# Patient Record
Sex: FEMALE | Race: WHITE | Employment: OTHER | URBAN - METROPOLITAN AREA
[De-identification: names, ages, dates, MRNs, and addresses within clinical notes are randomized per-mention and may not be internally consistent; named-entity substitution may affect disease eponyms.]

---

## 2017-04-04 PROCEDURE — 84439 ASSAY OF FREE THYROXINE: CPT | Performed by: SPECIALIST

## 2017-04-04 PROCEDURE — 85025 COMPLETE CBC W/AUTO DIFF WBC: CPT | Performed by: SPECIALIST

## 2017-04-04 PROCEDURE — 83090 ASSAY OF HOMOCYSTEINE: CPT | Performed by: SPECIALIST

## 2017-04-04 PROCEDURE — 87798 DETECT AGENT NOS DNA AMP: CPT | Performed by: SPECIALIST

## 2017-04-04 PROCEDURE — 82607 VITAMIN B-12: CPT | Performed by: SPECIALIST

## 2017-04-04 PROCEDURE — 36415 COLL VENOUS BLD VENIPUNCTURE: CPT | Performed by: SPECIALIST

## 2017-04-04 PROCEDURE — 86140 C-REACTIVE PROTEIN: CPT | Performed by: SPECIALIST

## 2017-04-04 PROCEDURE — 84481 FREE ASSAY (FT-3): CPT | Performed by: SPECIALIST

## 2017-11-27 PROBLEM — E78.00 HYPERCHOLESTEROLEMIA: Status: ACTIVE | Noted: 2017-11-27

## 2017-11-27 PROBLEM — M85.80 OSTEOPENIA: Status: ACTIVE | Noted: 2017-11-27

## 2017-11-27 PROBLEM — E55.9 VITAMIN D DEFICIENCY: Status: ACTIVE | Noted: 2017-11-27

## 2017-12-29 ENCOUNTER — APPOINTMENT (OUTPATIENT)
Dept: LAB | Age: 67
End: 2017-12-29
Attending: NURSE PRACTITIONER
Payer: MEDICARE

## 2017-12-29 PROCEDURE — 83525 ASSAY OF INSULIN: CPT | Performed by: NURSE PRACTITIONER

## 2017-12-29 PROCEDURE — 83090 ASSAY OF HOMOCYSTEINE: CPT | Performed by: NURSE PRACTITIONER

## 2017-12-29 PROCEDURE — 83695 ASSAY OF LIPOPROTEIN(A): CPT | Performed by: NURSE PRACTITIONER

## 2017-12-29 PROCEDURE — 82607 VITAMIN B-12: CPT | Performed by: NURSE PRACTITIONER

## 2017-12-29 PROCEDURE — 82652 VIT D 1 25-DIHYDROXY: CPT | Performed by: NURSE PRACTITIONER

## 2017-12-29 PROCEDURE — 86141 C-REACTIVE PROTEIN HS: CPT | Performed by: NURSE PRACTITIONER

## 2018-11-30 PROCEDURE — 83090 ASSAY OF HOMOCYSTEINE: CPT | Performed by: SPECIALIST

## 2018-11-30 PROCEDURE — 86141 C-REACTIVE PROTEIN HS: CPT | Performed by: SPECIALIST

## 2018-11-30 PROCEDURE — 86628 CANDIDA ANTIBODY: CPT | Performed by: SPECIALIST

## 2018-11-30 PROCEDURE — 86665 EPSTEIN-BARR CAPSID VCA: CPT | Performed by: SPECIALIST

## 2018-11-30 PROCEDURE — 86664 EPSTEIN-BARR NUCLEAR ANTIGEN: CPT | Performed by: SPECIALIST

## 2018-11-30 PROCEDURE — 81001 URINALYSIS AUTO W/SCOPE: CPT | Performed by: SPECIALIST

## 2018-11-30 PROCEDURE — 86376 MICROSOMAL ANTIBODY EACH: CPT | Performed by: SPECIALIST

## 2018-11-30 PROCEDURE — 86663 EPSTEIN-BARR ANTIBODY: CPT | Performed by: SPECIALIST

## 2018-11-30 PROCEDURE — 83525 ASSAY OF INSULIN: CPT | Performed by: SPECIALIST

## 2018-11-30 PROCEDURE — 83695 ASSAY OF LIPOPROTEIN(A): CPT | Performed by: SPECIALIST

## 2019-01-25 PROBLEM — M81.8 OTHER OSTEOPOROSIS WITHOUT CURRENT PATHOLOGICAL FRACTURE: Status: ACTIVE | Noted: 2019-01-25

## 2019-01-28 PROCEDURE — 81003 URINALYSIS AUTO W/O SCOPE: CPT | Performed by: NURSE PRACTITIONER

## 2019-04-18 PROCEDURE — 83525 ASSAY OF INSULIN: CPT | Performed by: NURSE PRACTITIONER

## 2019-04-18 PROCEDURE — 83695 ASSAY OF LIPOPROTEIN(A): CPT | Performed by: NURSE PRACTITIONER

## 2019-04-18 PROCEDURE — 81001 URINALYSIS AUTO W/SCOPE: CPT | Performed by: NURSE PRACTITIONER

## 2020-02-06 PROBLEM — H33.311 RETINAL TEAR OF RIGHT EYE: Status: ACTIVE | Noted: 2020-02-06

## 2022-10-16 ENCOUNTER — HOSPITAL ENCOUNTER (INPATIENT)
Facility: HOSPITAL | Age: 72
LOS: 4 days | Discharge: NURSING, REHAB OR LONG-TERM CARE | End: 2022-10-20
Attending: EMERGENCY MEDICINE | Admitting: HOSPITALIST
Payer: MEDICARE

## 2022-10-16 ENCOUNTER — APPOINTMENT (OUTPATIENT)
Dept: MRI IMAGING | Facility: HOSPITAL | Age: 72
End: 2022-10-16
Attending: EMERGENCY MEDICINE
Payer: MEDICARE

## 2022-10-16 DIAGNOSIS — R29.2 HOFFMAN SIGN PRESENT: ICD-10-CM

## 2022-10-16 DIAGNOSIS — H61.23 BILATERAL HEARING LOSS DUE TO CERUMEN IMPACTION: ICD-10-CM

## 2022-10-16 DIAGNOSIS — I63.9 ACUTE CVA (CEREBROVASCULAR ACCIDENT) (HCC): Primary | ICD-10-CM

## 2022-10-16 DIAGNOSIS — Z86.16 HISTORY OF COVID-19: ICD-10-CM

## 2022-10-16 DIAGNOSIS — R53.1 WEAKNESS GENERALIZED: ICD-10-CM

## 2022-10-16 LAB
ANION GAP SERPL CALC-SCNC: 4 MMOL/L (ref 0–18)
APTT PPP: 28.4 SECONDS (ref 23.3–35.6)
BASOPHILS # BLD AUTO: 0.05 X10(3) UL (ref 0–0.2)
BASOPHILS NFR BLD AUTO: 0.6 %
BUN BLD-MCNC: 13 MG/DL (ref 7–18)
BUN/CREAT SERPL: 21.7 (ref 10–20)
CALCIUM BLD-MCNC: 8.4 MG/DL (ref 8.5–10.1)
CHLORIDE SERPL-SCNC: 110 MMOL/L (ref 98–112)
CHOLEST SERPL-MCNC: 156 MG/DL (ref ?–200)
CO2 SERPL-SCNC: 27 MMOL/L (ref 21–32)
CREAT BLD-MCNC: 0.6 MG/DL
DEPRECATED RDW RBC AUTO: 41.9 FL (ref 35.1–46.3)
EOSINOPHIL # BLD AUTO: 0.04 X10(3) UL (ref 0–0.7)
EOSINOPHIL NFR BLD AUTO: 0.5 %
ERYTHROCYTE [DISTWIDTH] IN BLOOD BY AUTOMATED COUNT: 12.7 % (ref 11–15)
GFR SERPLBLD BASED ON 1.73 SQ M-ARVRAT: 95 ML/MIN/1.73M2 (ref 60–?)
GLUCOSE BLD-MCNC: 115 MG/DL (ref 70–99)
GLUCOSE BLDC GLUCOMTR-MCNC: 105 MG/DL (ref 70–99)
HCT VFR BLD AUTO: 41.8 %
HDLC SERPL-MCNC: 55 MG/DL (ref 40–59)
HGB BLD-MCNC: 13.9 G/DL
IMM GRANULOCYTES # BLD AUTO: 0.03 X10(3) UL (ref 0–1)
IMM GRANULOCYTES NFR BLD: 0.4 %
INR BLD: 1.1 (ref 0.85–1.16)
LDLC SERPL CALC-MCNC: 91 MG/DL (ref ?–100)
LYMPHOCYTES # BLD AUTO: 1.88 X10(3) UL (ref 1–4)
LYMPHOCYTES NFR BLD AUTO: 23.4 %
MCH RBC QN AUTO: 30 PG (ref 26–34)
MCHC RBC AUTO-ENTMCNC: 33.3 G/DL (ref 31–37)
MCV RBC AUTO: 90.1 FL
MONOCYTES # BLD AUTO: 0.89 X10(3) UL (ref 0.1–1)
MONOCYTES NFR BLD AUTO: 11.1 %
NEUTROPHILS # BLD AUTO: 5.15 X10 (3) UL (ref 1.5–7.7)
NEUTROPHILS # BLD AUTO: 5.15 X10(3) UL (ref 1.5–7.7)
NEUTROPHILS NFR BLD AUTO: 64 %
NONHDLC SERPL-MCNC: 101 MG/DL (ref ?–130)
OSMOLALITY SERPL CALC.SUM OF ELEC: 293 MOSM/KG (ref 275–295)
PLATELET # BLD AUTO: 315 10(3)UL (ref 150–450)
POTASSIUM SERPL-SCNC: 4.2 MMOL/L (ref 3.5–5.1)
PROTHROMBIN TIME: 14.1 SECONDS (ref 11.6–14.8)
RBC # BLD AUTO: 4.64 X10(6)UL
SARS-COV-2 RNA RESP QL NAA+PROBE: DETECTED
SODIUM SERPL-SCNC: 141 MMOL/L (ref 136–145)
TRIGL SERPL-MCNC: 48 MG/DL (ref 30–149)
VLDLC SERPL CALC-MCNC: 8 MG/DL (ref 0–30)
WBC # BLD AUTO: 8 X10(3) UL (ref 4–11)

## 2022-10-16 PROCEDURE — 70551 MRI BRAIN STEM W/O DYE: CPT | Performed by: EMERGENCY MEDICINE

## 2022-10-16 RX ORDER — ASPIRIN 81 MG/1
81 TABLET, CHEWABLE ORAL DAILY
Status: DISCONTINUED | OUTPATIENT
Start: 2022-10-17 | End: 2022-10-20

## 2022-10-16 RX ORDER — LORAZEPAM 1 MG/1
0.5 TABLET ORAL ONCE
Status: COMPLETED | OUTPATIENT
Start: 2022-10-16 | End: 2022-10-16

## 2022-10-16 RX ORDER — ASPIRIN 300 MG/1
300 SUPPOSITORY RECTAL ONCE
Status: COMPLETED | OUTPATIENT
Start: 2022-10-16 | End: 2022-10-16

## 2022-10-16 RX ORDER — METOCLOPRAMIDE HYDROCHLORIDE 5 MG/ML
10 INJECTION INTRAMUSCULAR; INTRAVENOUS EVERY 8 HOURS PRN
Status: DISCONTINUED | OUTPATIENT
Start: 2022-10-16 | End: 2022-10-20

## 2022-10-16 RX ORDER — ASPIRIN 81 MG/1
324 TABLET, CHEWABLE ORAL ONCE
Status: DISCONTINUED | OUTPATIENT
Start: 2022-10-16 | End: 2022-10-16

## 2022-10-16 RX ORDER — HYDRALAZINE HYDROCHLORIDE 20 MG/ML
10 INJECTION INTRAMUSCULAR; INTRAVENOUS EVERY 2 HOUR PRN
Status: DISCONTINUED | OUTPATIENT
Start: 2022-10-16 | End: 2022-10-20

## 2022-10-16 RX ORDER — ENOXAPARIN SODIUM 100 MG/ML
40 INJECTION SUBCUTANEOUS DAILY
Status: DISCONTINUED | OUTPATIENT
Start: 2022-10-17 | End: 2022-10-20

## 2022-10-16 RX ORDER — LABETALOL HYDROCHLORIDE 5 MG/ML
10 INJECTION, SOLUTION INTRAVENOUS EVERY 10 MIN PRN
Status: DISCONTINUED | OUTPATIENT
Start: 2022-10-16 | End: 2022-10-20

## 2022-10-16 RX ORDER — ONDANSETRON 2 MG/ML
4 INJECTION INTRAMUSCULAR; INTRAVENOUS EVERY 6 HOURS PRN
Status: DISCONTINUED | OUTPATIENT
Start: 2022-10-16 | End: 2022-10-20

## 2022-10-16 RX ORDER — QUINIDINE SULFATE 200 MG
3 TABLET ORAL DAILY
COMMUNITY

## 2022-10-16 RX ORDER — ACETAMINOPHEN 650 MG/1
650 SUPPOSITORY RECTAL EVERY 4 HOURS PRN
Status: DISCONTINUED | OUTPATIENT
Start: 2022-10-16 | End: 2022-10-20

## 2022-10-16 RX ORDER — ACETAMINOPHEN 325 MG/1
650 TABLET ORAL EVERY 4 HOURS PRN
Status: DISCONTINUED | OUTPATIENT
Start: 2022-10-16 | End: 2022-10-20

## 2022-10-16 RX ORDER — ATORVASTATIN CALCIUM 40 MG/1
40 TABLET, FILM COATED ORAL NIGHTLY
Status: DISCONTINUED | OUTPATIENT
Start: 2022-10-16 | End: 2022-10-20

## 2022-10-16 NOTE — ED INITIAL ASSESSMENT (HPI)
Patient to Er from home with c/o + covid test 12 days ago. Patient states her pulse ox went to 90% while at home. Patient denies shortness of breath. Speaking in full sentences. C/o ear pressure and fatigue.

## 2022-10-16 NOTE — ED QUICK NOTES
Patient notes that she fell 2 days ago due to \"feel off balanced\". Denies injury or complaint due to fall.

## 2022-10-17 ENCOUNTER — APPOINTMENT (OUTPATIENT)
Dept: CV DIAGNOSTICS | Facility: HOSPITAL | Age: 72
End: 2022-10-17
Attending: HOSPITALIST
Payer: MEDICARE

## 2022-10-17 ENCOUNTER — APPOINTMENT (OUTPATIENT)
Dept: CT IMAGING | Facility: HOSPITAL | Age: 72
End: 2022-10-17
Attending: Other
Payer: MEDICARE

## 2022-10-17 LAB
ANION GAP SERPL CALC-SCNC: 3 MMOL/L (ref 0–18)
ANION GAP SERPL CALC-SCNC: 6 MMOL/L (ref 0–18)
BASOPHILS # BLD AUTO: 0.04 X10(3) UL (ref 0–0.2)
BASOPHILS NFR BLD AUTO: 0.7 %
BUN BLD-MCNC: 12 MG/DL (ref 7–18)
BUN BLD-MCNC: 13 MG/DL (ref 7–18)
BUN/CREAT SERPL: 25.5 (ref 10–20)
BUN/CREAT SERPL: 26 (ref 10–20)
CALCIUM BLD-MCNC: 7.9 MG/DL (ref 8.5–10.1)
CALCIUM BLD-MCNC: 8.2 MG/DL (ref 8.5–10.1)
CHLORIDE SERPL-SCNC: 111 MMOL/L (ref 98–112)
CHLORIDE SERPL-SCNC: 111 MMOL/L (ref 98–112)
CO2 SERPL-SCNC: 26 MMOL/L (ref 21–32)
CO2 SERPL-SCNC: 28 MMOL/L (ref 21–32)
CREAT BLD-MCNC: 0.47 MG/DL
CREAT BLD-MCNC: 0.5 MG/DL
DEPRECATED RDW RBC AUTO: 43.7 FL (ref 35.1–46.3)
EOSINOPHIL # BLD AUTO: 0.16 X10(3) UL (ref 0–0.7)
EOSINOPHIL NFR BLD AUTO: 2.7 %
ERYTHROCYTE [DISTWIDTH] IN BLOOD BY AUTOMATED COUNT: 13 % (ref 11–15)
EST. AVERAGE GLUCOSE BLD GHB EST-MCNC: 126 MG/DL (ref 68–126)
GFR SERPLBLD BASED ON 1.73 SQ M-ARVRAT: 100 ML/MIN/1.73M2 (ref 60–?)
GFR SERPLBLD BASED ON 1.73 SQ M-ARVRAT: 99 ML/MIN/1.73M2 (ref 60–?)
GLUCOSE BLD-MCNC: 100 MG/DL (ref 70–99)
GLUCOSE BLD-MCNC: 102 MG/DL (ref 70–99)
GLUCOSE BLDC GLUCOMTR-MCNC: 103 MG/DL (ref 70–99)
HBA1C MFR BLD: 6 % (ref ?–5.7)
HCT VFR BLD AUTO: 40.9 %
HGB BLD-MCNC: 13.2 G/DL
IMM GRANULOCYTES # BLD AUTO: 0.02 X10(3) UL (ref 0–1)
IMM GRANULOCYTES NFR BLD: 0.3 %
LYMPHOCYTES # BLD AUTO: 1.22 X10(3) UL (ref 1–4)
LYMPHOCYTES NFR BLD AUTO: 20.5 %
MAGNESIUM SERPL-MCNC: 2.6 MG/DL (ref 1.6–2.6)
MCH RBC QN AUTO: 29.7 PG (ref 26–34)
MCHC RBC AUTO-ENTMCNC: 32.3 G/DL (ref 31–37)
MCV RBC AUTO: 91.9 FL
MONOCYTES # BLD AUTO: 0.76 X10(3) UL (ref 0.1–1)
MONOCYTES NFR BLD AUTO: 12.8 %
NEUTROPHILS # BLD AUTO: 3.76 X10 (3) UL (ref 1.5–7.7)
NEUTROPHILS # BLD AUTO: 3.76 X10(3) UL (ref 1.5–7.7)
NEUTROPHILS NFR BLD AUTO: 63 %
OSMOLALITY SERPL CALC.SUM OF ELEC: 294 MOSM/KG (ref 275–295)
OSMOLALITY SERPL CALC.SUM OF ELEC: 296 MOSM/KG (ref 275–295)
PLATELET # BLD AUTO: 292 10(3)UL (ref 150–450)
POTASSIUM SERPL-SCNC: 3.8 MMOL/L (ref 3.5–5.1)
POTASSIUM SERPL-SCNC: 3.9 MMOL/L (ref 3.5–5.1)
RBC # BLD AUTO: 4.45 X10(6)UL
SODIUM SERPL-SCNC: 142 MMOL/L (ref 136–145)
SODIUM SERPL-SCNC: 143 MMOL/L (ref 136–145)
WBC # BLD AUTO: 6 X10(3) UL (ref 4–11)

## 2022-10-17 PROCEDURE — 93306 TTE W/DOPPLER COMPLETE: CPT | Performed by: HOSPITALIST

## 2022-10-17 PROCEDURE — 70498 CT ANGIOGRAPHY NECK: CPT | Performed by: OTHER

## 2022-10-17 PROCEDURE — 70496 CT ANGIOGRAPHY HEAD: CPT | Performed by: OTHER

## 2022-10-17 RX ORDER — HYDROXYZINE HYDROCHLORIDE 10 MG/1
10 TABLET, FILM COATED ORAL 3 TIMES DAILY PRN
Status: DISCONTINUED | OUTPATIENT
Start: 2022-10-17 | End: 2022-10-20

## 2022-10-17 RX ORDER — MAGNESIUM OXIDE 400 MG (241.3 MG MAGNESIUM) TABLET
1 TABLET NIGHTLY
Status: DISCONTINUED | OUTPATIENT
Start: 2022-10-17 | End: 2022-10-20

## 2022-10-17 RX ORDER — SODIUM CHLORIDE 9 MG/ML
INJECTION, SOLUTION INTRAVENOUS CONTINUOUS
Status: DISCONTINUED | OUTPATIENT
Start: 2022-10-17 | End: 2022-10-18

## 2022-10-17 NOTE — PLAN OF CARE
Bed locked in low position, belongings in reach, bed alarm on as patient fell 3 days ago at home, call light in reach. Frequent rounding done, all patient needs met. Non-slip socks on/at bedside. Plan for an echo and [address] today. Every 2 hour neuros completed at 4am, with some slight improvement to the strength on left side (foot more than hand). Every 4 hour neuros start at 0800 today. NIH daily. Due to LT lip droop and slurring some words patient failed the bedside swallow eval, awaiting ST to evaluate patient. Son Tony Aguilar to bring POA papers for dayshift to copy this morning to have on file. Was able to SBA patient to the BR this morning, she did well, felt no dizziness at that time. Problem: Patient Centered Care  Goal: Patient preferences are identified and integrated in the patient's plan of care  Description: Interventions:  - What would you like us to know as we care for you? I live alone with my dogs. - Provide timely, complete, and accurate information to patient/family  - Incorporate patient and family knowledge, values, beliefs, and cultural backgrounds into the planning and delivery of care  - Encourage patient/family to participate in care and decision-making at the level they choose  - Honor patient and family perspectives and choices  Outcome: Progressing     Problem: CARDIOVASCULAR - ADULT  Goal: Maintains optimal cardiac output and hemodynamic stability  Description: INTERVENTIONS:  - Monitor vital signs, rhythm, and trends  - Monitor for bleeding, hypotension and signs of decreased cardiac output  - Evaluate effectiveness of vasoactive medications to optimize hemodynamic stability  - Monitor arterial and/or venous puncture sites for bleeding and/or hematoma  - Assess quality of pulses, skin color and temperature  - Assess for signs of decreased coronary artery perfusion - ex.  Angina  - Evaluate fluid balance, assess for edema, trend weights  Outcome: Progressing  Goal: Absence of cardiac arrhythmias or at baseline  Description: INTERVENTIONS:  - Continuous cardiac monitoring, monitor vital signs, obtain 12 lead EKG if indicated  - Evaluate effectiveness of antiarrhythmic and heart rate control medications as ordered  - Initiate emergency measures for life threatening arrhythmias  - Monitor electrolytes and administer replacement therapy as ordered  Outcome: Progressing     Problem: METABOLIC/FLUID AND ELECTROLYTES - ADULT  Goal: Electrolytes maintained within normal limits  Description: INTERVENTIONS:  - Monitor labs and rhythm and assess patient for signs and symptoms of electrolyte imbalances  - Administer electrolyte replacement as ordered  - Monitor response to electrolyte replacements, including rhythm and repeat lab results as appropriate  - Fluid restriction as ordered  - Instruct patient on fluid and nutrition restrictions as appropriate  Outcome: Progressing     Problem: NEUROLOGICAL - ADULT  Goal: Achieves stable or improved neurological status  Description: INTERVENTIONS  - Assess for and report changes in neurological status  - Initiate measures to prevent increased intracranial pressure  - Maintain blood pressure and fluid volume within ordered parameters to optimize cerebral perfusion and minimize risk of hemorrhage  - Monitor temperature, glucose, and sodium.  Initiate appropriate interventions as ordered  Outcome: Progressing  Goal: Achieves maximal functionality and self care  Description: INTERVENTIONS  - Monitor swallowing and airway patency with patient fatigue and changes in neurological status  - Encourage and assist patient to increase activity and self care with guidance from PT/OT  - Encourage visually impaired, hearing impaired and aphasic patients to use assistive/communication devices  Outcome: Progressing

## 2022-10-17 NOTE — ED PROVIDER NOTES
Signed out by previous shift to follow-up results of diagnostic work-up. Patient had initially presented with some generalized weakness and lightheadedness. She subsequently mentioned after initial work-up was completed that she thought she is having some weakness in her left arm and her friend had stated that she had had some dysarthria on the phone earlier today. Patient has no dysarthria at this time but does have some mild left facial droop and slight decrease in  strength in her left hand despite having no difficulty in movement or any drift. 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 Patient has been covered with aspirin. Discussed with [facility] hospitalist as well as neurology. Patient and family are comfortable with plan for admission. Patient is not a candidate for tPA given onset of symptoms was last night. Critical care time exclusive of procedure time spent on this patient was 35 min for taking history from patient examining patient, medical decision-making, reviewing lab work and radiology studies, explaining results to patient and family, discussing with consultants/admitting physician, and documenting in patient's chart.

## 2022-10-17 NOTE — ED QUICK NOTES
Patient failed dysphasia screening - aspirin given rectally. Patient and family made aware of dispo. Awaiting for admit bed. NIH scale completed, baseline of 3. Vital signs stable.  Patient resting in cart at this time

## 2022-10-17 NOTE — CM/SW NOTE
10/17/22 1100   CM/SW Referral Data   Referral Source Physician   Reason for Referral Protocol order set   Specify order set Stroke   Informant Patient   Pertinent Medical Hx   Does patient have an established PCP? Yes  (Reanna Spicer)   Patient [address]   Number of Levels in Home 1   Number of Stair in Home 1   Patient lives with Alone   Patient Status Prior to Admission   Independent with ADLs and Mobility Yes   Discharge Needs   Anticipated D/C needs Home health care   Choice of Post-Acute Provider   Informed patient of right to choose their preferred provider Yes   List of appropriate post-acute services provided to patient/family with quality data   ([facility] - needs f/up)     SW received MDO for [facility]. SW met w/ pt and her son in her room wearing appropriate PPE. Above assessment completed. Per pt, she lives alone and does not use any assistive devices for ambulation. Pt confirms she was driving until she was first positive for COVID and became weak. Pt denies hx w/ HH or [facility]. Pt has hx w/ outpatient PT at [facility] after a knee surgery. Per RN rounds, pt is ambulating w/ 1 assist and a walker at this time. SW discussed possible recommendation for HH at time of DC. Pt is agreeable to this. SW requested [facility] send [facility] ref[facility] via [facility]. F2F entered. Will need to f/up and provide [facility] list for choice. PLAN: Home w/ HH - pending PT/OT evals/recs, pending accept/choice, pending med clear      SW/CM to remain available for support and/or discharge planning.          Lisa Herrera, MSW, [facility]

## 2022-10-17 NOTE — ED QUICK NOTES
Orders for admission, patient is aware of plan and ready to go upstairs. Any questions, please call ED GENA Valenzuela at extension [phone].      Patient Covid vaccination status: Unvaccinated     COVID Test Ordered in ED: Rapid SARS-CoV-2 by PCR    COVID Suspicion at Admission: N/A    Running Infusions:  None    Mental Status/LOC at time of transport: A&Ox4    Other pertinent information:   CIWA score: N/A   NIH score:  N/A

## 2022-10-17 NOTE — CM/SW NOTE
Department  notified of request for [facility] referrals started. Assigned CM/SW to follow up with pt/family on further discharge planning.      Cl Garg   October 17, 2022   11:31

## 2022-10-18 ENCOUNTER — APPOINTMENT (OUTPATIENT)
Dept: CT IMAGING | Facility: HOSPITAL | Age: 72
End: 2022-10-18
Attending: HOSPITALIST
Payer: MEDICARE

## 2022-10-18 LAB
ANION GAP SERPL CALC-SCNC: 7 MMOL/L (ref 0–18)
BUN BLD-MCNC: 18 MG/DL (ref 7–18)
BUN/CREAT SERPL: 29 (ref 10–20)
CALCIUM BLD-MCNC: 7.8 MG/DL (ref 8.5–10.1)
CHLORIDE SERPL-SCNC: 111 MMOL/L (ref 98–112)
CO2 SERPL-SCNC: 26 MMOL/L (ref 21–32)
CREAT BLD-MCNC: 0.62 MG/DL
GFR SERPLBLD BASED ON 1.73 SQ M-ARVRAT: 94 ML/MIN/1.73M2 (ref 60–?)
GLUCOSE BLD-MCNC: 162 MG/DL (ref 70–99)
GLUCOSE BLDC GLUCOMTR-MCNC: 102 MG/DL (ref 70–99)
GLUCOSE BLDC GLUCOMTR-MCNC: 108 MG/DL (ref 70–99)
GLUCOSE BLDC GLUCOMTR-MCNC: 96 MG/DL (ref 70–99)
GLUCOSE BLDC GLUCOMTR-MCNC: 97 MG/DL (ref 70–99)
OSMOLALITY SERPL CALC.SUM OF ELEC: 303 MOSM/KG (ref 275–295)
POTASSIUM SERPL-SCNC: 3.5 MMOL/L (ref 3.5–5.1)
SODIUM SERPL-SCNC: 144 MMOL/L (ref 136–145)

## 2022-10-18 PROCEDURE — 99223 1ST HOSP IP/OBS HIGH 75: CPT | Performed by: OTHER

## 2022-10-18 PROCEDURE — 71260 CT THORAX DX C+: CPT | Performed by: HOSPITALIST

## 2022-10-18 RX ORDER — ATORVASTATIN CALCIUM 40 MG/1
40 TABLET, FILM COATED ORAL NIGHTLY
Qty: 90 TABLET | Refills: 1 | Status: SHIPPED | OUTPATIENT
Start: 2022-10-18 | End: 2023-04-16

## 2022-10-18 RX ORDER — ASPIRIN 81 MG/1
81 TABLET, CHEWABLE ORAL DAILY
Qty: 90 TABLET | Refills: 3 | Status: SHIPPED | OUTPATIENT
Start: 2022-10-19 | End: 2023-10-19

## 2022-10-18 NOTE — CM/SW NOTE
Department  notified of request for Acute Rehab, [facility] referrals started. Assigned CM/SW to follow up with pt/family on further discharge planning.      Slava Moore   October 18, 2022   14:01

## 2022-10-18 NOTE — PLAN OF CARE
Bed locked in low position, belongings in reach, call light in reach. Frequent rounding done, all patient needs met. Non-slip socks on/at bedside. Son at bedside tonight. Neuros Q4 hrs continue, patient is almost equally strong bilaterally, slight droop and slur still noted. Awaiting PT/OT eval hopefully later today. Atarax given for anxiety, see MAR. IVF continues. Pills whole in apple sauce, NTL no straw. Problem: CARDIOVASCULAR - ADULT  Goal: Maintains optimal cardiac output and hemodynamic stability  Description: INTERVENTIONS:  - Monitor vital signs, rhythm, and trends  - Monitor for bleeding, hypotension and signs of decreased cardiac output  - Evaluate effectiveness of vasoactive medications to optimize hemodynamic stability  - Monitor arterial and/or venous puncture sites for bleeding and/or hematoma  - Assess quality of pulses, skin color and temperature  - Assess for signs of decreased coronary artery perfusion - ex.  Angina  - Evaluate fluid balance, assess for edema, trend weights  Outcome: Progressing  Goal: Absence of cardiac arrhythmias or at baseline  Description: INTERVENTIONS:  - Continuous cardiac monitoring, monitor vital signs, obtain 12 lead EKG if indicated  - Evaluate effectiveness of antiarrhythmic and heart rate control medications as ordered  - Initiate emergency measures for life threatening arrhythmias  - Monitor electrolytes and administer replacement therapy as ordered  Outcome: Progressing     Problem: METABOLIC/FLUID AND ELECTROLYTES - ADULT  Goal: Electrolytes maintained within normal limits  Description: INTERVENTIONS:  - Monitor labs and rhythm and assess patient for signs and symptoms of electrolyte imbalances  - Administer electrolyte replacement as ordered  - Monitor response to electrolyte replacements, including rhythm and repeat lab results as appropriate  - Fluid restriction as ordered  - Instruct patient on fluid and nutrition restrictions as appropriate  Outcome: Progressing     Problem: NEUROLOGICAL - ADULT  Goal: Achieves stable or improved neurological status  Description: INTERVENTIONS  - Assess for and report changes in neurological status  - Initiate measures to prevent increased intracranial pressure  - Maintain blood pressure and fluid volume within ordered parameters to optimize cerebral perfusion and minimize risk of hemorrhage  - Monitor temperature, glucose, and sodium.  Initiate appropriate interventions as ordered  Outcome: Progressing  Goal: Achieves maximal functionality and self care  Description: INTERVENTIONS  - Monitor swallowing and airway patency with patient fatigue and changes in neurological status  - Encourage and assist patient to increase activity and self care with guidance from PT/OT  - Encourage visually impaired, hearing impaired and aphasic patients to use assistive/communication devices  Outcome: Progressing

## 2022-10-18 NOTE — CM/SW NOTE
PT/OT/ST are recommending acute rehab for the pt. SW met with the pt. And her family at bedside and explained acute rehab. The pt. Is agreeable to referrals being sent. PMR consult is entered and pending. Will need to follow up with list and choice. The pt. Is aware and agreeable to the above.      Grace Sauer, [facility] ext [phone]

## 2022-10-18 NOTE — HOME CARE LIAISON
Patient and sons met with and provided with list of [facility] providers from [facility], patient choice is [address]. Agency reserved in [facility] and contact information placed on AVS.   Financial interest disclosure provided to patient. JESUS Carvalho Slight updated.

## 2022-10-18 NOTE — PROGRESS NOTES
SPEECH/LANGUAGE/COGNITIVE EVALUATION - INPATIENT    Admission Date: 10/16/2022  Evaluation Date: 10/18/22    Reason for Referral: Stroke protocol    ASSESSMENT & PLAN   ASSESSMENT & IMPRESSION    Patient seen for cognitive-communication evaluation per stroke protocol, MRI brain remarkable for R central semiovale infarct as below. Patient admitted from home; PLOF independent in home, driving, retired, active in home/community. Patient denies hx speech/language/cognitive impairment. Oral motor mechanism examination remarkable for mild L facial droop with speech intelligibility preserved to 100% in known context with use of slow speech rate. Language supports conversation without overt deficits. Patient achieved SLUMS score of 26/30 which indicates cognitive-communication [facility] however anticipate patient would benefit from more complex/thorough assessment of cognitive-communication in next level of care in context of high PLOF and motivation to resolve symptoms. Plan and recommendations reviewed with patient, patient's sons and RN at bedside. [place] Mental Status (SLUMS) Examination. The results were as follows:    Orientation and Attention (out of 3): 3  Memory (out of 5-no points given in this item): 5  Calculation and Registration (out of 3): 3  Generative Naming with time constraint (out of 3): 2 (12 items/minute)  Delayed Recall with interference (out of 5): 4  Registration and Digit Span/Reverse Number Recall (out of 2): 1  Clock Drawing (out of 4): 4  Visuospatial Skills/Figure Identification (out of 2):  2  Auditory Comprehension/Story Recall with Executive Function (out of 8): 8  Composite: 26/30  (Normal: 25-30/30, Mild Neurocognitive Disorder: 20-24/30, Dementia: 1-19/30)    Assessment(s) Administered: SLUMS 26/30    Discharge Recommendations/Plan: Acute rehabilitation - SLP consult     Patient Experiencing Pain: No    Prior Living Situation: Home alone  Prior Level of Function: Independent Imaging Results:   MRI brain 10/16/22:  CONCLUSION: Abnormal diffusion restriction throughout the right basal ganglia compatible with acute/recent infarct. Patient/Family Goals: Return home independently    Interdisciplinary Communication: Discussed with RN    Patient, family and/or caregiver has been informed and has taken part in this evaluation and plan of treatment and have been advised and agree on the findings and goals.       FOLLOW UP  Acute cognitive-communciation f/u does not appear indicated at this time, recommend f/u in next level of care    Thank you for your referral.  If you have any questions please contact Kamala De Oliveira, [facility] Pathologist  [phone]

## 2022-10-18 NOTE — CM/SW NOTE
[facility] been reserved in [address]. [facility] will continue to follow along with the pt. PT/OT evals are still pending. Plan: TBD pending PT/OT evals.      Ronald NegronAt[facility] Health [facility] Baldwin ext [phone]

## 2022-10-19 LAB
GLUCOSE BLDC GLUCOMTR-MCNC: 105 MG/DL (ref 70–99)
GLUCOSE BLDC GLUCOMTR-MCNC: 112 MG/DL (ref 70–99)
GLUCOSE BLDC GLUCOMTR-MCNC: 85 MG/DL (ref 70–99)

## 2022-10-19 NOTE — PLAN OF CARE
Pt received in no acute distress, NIHSS daily, neuro q shift, up w/SB, plan is acute rehab, awaiting PMR eval. Plan is CT chest. Fall/safety precautions in place, call light within pt reach, hourly rounding maintained. Problem: Patient Centered Care  Goal: Patient preferences are identified and integrated in the patient's plan of care  Description: Interventions:  - What would you like us to know as we care for you? From home alone  - Provide timely, complete, and accurate information to patient/family  - Incorporate patient and family knowledge, values, beliefs, and cultural backgrounds into the planning and delivery of care  - Encourage patient/family to participate in care and decision-making at the level they choose  - Honor patient and family perspectives and choices  Outcome: Progressing     Problem: CARDIOVASCULAR - ADULT  Goal: Maintains optimal cardiac output and hemodynamic stability  Description: INTERVENTIONS:  - Monitor vital signs, rhythm, and trends  - Monitor for bleeding, hypotension and signs of decreased cardiac output  - Evaluate effectiveness of vasoactive medications to optimize hemodynamic stability  - Monitor arterial and/or venous puncture sites for bleeding and/or hematoma  - Assess quality of pulses, skin color and temperature  - Assess for signs of decreased coronary artery perfusion - ex.  Angina  - Evaluate fluid balance, assess for edema, trend weights  Outcome: Progressing  Goal: Absence of cardiac arrhythmias or at baseline  Description: INTERVENTIONS:  - Continuous cardiac monitoring, monitor vital signs, obtain 12 lead EKG if indicated  - Evaluate effectiveness of antiarrhythmic and heart rate control medications as ordered  - Initiate emergency measures for life threatening arrhythmias  - Monitor electrolytes and administer replacement therapy as ordered  Outcome: Progressing     Problem: METABOLIC/FLUID AND ELECTROLYTES - ADULT  Goal: Electrolytes maintained within normal limits  Description: INTERVENTIONS:  - Monitor labs and rhythm and assess patient for signs and symptoms of electrolyte imbalances  - Administer electrolyte replacement as ordered  - Monitor response to electrolyte replacements, including rhythm and repeat lab results as appropriate  - Fluid restriction as ordered  - Instruct patient on fluid and nutrition restrictions as appropriate  Outcome: Progressing     Problem: NEUROLOGICAL - ADULT  Goal: Achieves stable or improved neurological status  Description: INTERVENTIONS  - Assess for and report changes in neurological status  - Initiate measures to prevent increased intracranial pressure  - Maintain blood pressure and fluid volume within ordered parameters to optimize cerebral perfusion and minimize risk of hemorrhage  - Monitor temperature, glucose, and sodium.  Initiate appropriate interventions as ordered  Outcome: Progressing  Goal: Achieves maximal functionality and self care  Description: INTERVENTIONS  - Monitor swallowing and airway patency with patient fatigue and changes in neurological status  - Encourage and assist patient to increase activity and self care with guidance from PT/OT  - Encourage visually impaired, hearing impaired and aphasic patients to use assistive/communication devices  Outcome: Progressing     Problem: Patient/Family Goals  Goal: Patient/Family Long Term Goal  Description: Patient's Long Term Goal: to be discharged home safely  Interventions:  - follow MD orders  -incentive spirometer   -encourage ambulation  -PT/OT  - See additional Care Plan goals for specific interventions  Outcome: Progressing  Goal: Patient/Family Short Term Goal  Description: Patient's Short Term Goal: To regain strength  Interventions:   - encourage ambulation  -PT/OT/Speech   - Range of motion  - See additional Care Plan goals for specific interventions  Outcome: Progressing

## 2022-10-19 NOTE — PLAN OF CARE
No acute changes overnight for Patricia Fluke. She denied any pain, headache, vision changes, or SOB. Ambulating well in room. She went down for a follow-up CT Chest to further investigate nodule in lung, results pending. She is anticipated to discharge to acute rehab pending medical clearance. Problem: Patient Centered Care  Goal: Patient preferences are identified and integrated in the patient's plan of care  Description: Interventions:  - What would you like us to know as we care for you? From home alone  - Provide timely, complete, and accurate information to patient/family  - Incorporate patient and family knowledge, values, beliefs, and cultural backgrounds into the planning and delivery of care  - Encourage patient/family to participate in care and decision-making at the level they choose  - Honor patient and family perspectives and choices  Outcome: Progressing     Problem: CARDIOVASCULAR - ADULT  Goal: Maintains optimal cardiac output and hemodynamic stability  Description: INTERVENTIONS:  - Monitor vital signs, rhythm, and trends  - Monitor for bleeding, hypotension and signs of decreased cardiac output  - Evaluate effectiveness of vasoactive medications to optimize hemodynamic stability  - Monitor arterial and/or venous puncture sites for bleeding and/or hematoma  - Assess quality of pulses, skin color and temperature  - Assess for signs of decreased coronary artery perfusion - ex.  Angina  - Evaluate fluid balance, assess for edema, trend weights  Outcome: Progressing  Goal: Absence of cardiac arrhythmias or at baseline  Description: INTERVENTIONS:  - Continuous cardiac monitoring, monitor vital signs, obtain 12 lead EKG if indicated  - Evaluate effectiveness of antiarrhythmic and heart rate control medications as ordered  - Initiate emergency measures for life threatening arrhythmias  - Monitor electrolytes and administer replacement therapy as ordered  Outcome: Progressing     Problem: METABOLIC/FLUID AND ELECTROLYTES - ADULT  Goal: Electrolytes maintained within normal limits  Description: INTERVENTIONS:  - Monitor labs and rhythm and assess patient for signs and symptoms of electrolyte imbalances  - Administer electrolyte replacement as ordered  - Monitor response to electrolyte replacements, including rhythm and repeat lab results as appropriate  - Fluid restriction as ordered  - Instruct patient on fluid and nutrition restrictions as appropriate  Outcome: Progressing     Problem: NEUROLOGICAL - ADULT  Goal: Achieves stable or improved neurological status  Description: INTERVENTIONS  - Assess for and report changes in neurological status  - Initiate measures to prevent increased intracranial pressure  - Maintain blood pressure and fluid volume within ordered parameters to optimize cerebral perfusion and minimize risk of hemorrhage  - Monitor temperature, glucose, and sodium.  Initiate appropriate interventions as ordered  Outcome: Progressing  Goal: Achieves maximal functionality and self care  Description: INTERVENTIONS  - Monitor swallowing and airway patency with patient fatigue and changes in neurological status  - Encourage and assist patient to increase activity and self care with guidance from PT/OT  - Encourage visually impaired, hearing impaired and aphasic patients to use assistive/communication devices  Outcome: Progressing     Problem: Patient/Family Goals  Goal: Patient/Family Long Term Goal  Description: Patient's Long Term Goal: Maximize my recovery from the stroke to minimize deficits    Interventions:  - Ambulate as tolerated  - Neuro checks  - Brain imaging  - Neuro consult  - PT/OT   - Rehab - discharge education  - See additional Care Plan goals for specific interventions  Outcome: Progressing  Goal: Patient/Family Short Term Goal  Description: Patient's Short Term Goal: to regain my strength    Interventions:   - Ambulate as tolerated  - PT/OT  - See additional Care Plan goals for specific interventions  Outcome: Progressing

## 2022-10-19 NOTE — CM/SW NOTE
08: 35AM  SW obtained list of accepting Acute Rehab facilities via Aidin. PMR recommends Acute at [facility]. SW provided Rehab list to pt's RN/Talisha and requested she provide to pt and pt's family while SW completes RN rounds this AM.    Per RN, pt likely medically cleared and only pending rehab placement. 10:35AM  SW met w/ pt and 2 sons in her room for f/up discussion. SW addressed/answered all questions from pt and sons. Pt confirmed choice for [facility] in [facility]. SW also confirmed next steps for medical clearance and bed confirmation at [facility]. SW also informed them about [facility] transportation - pt and sons expressed understanding and are agreeable. CONNIE Jorge 1 reserved via Aidin. RODDY sent secure message notifying [facility] of pt's potential for medical clearance today - pending response. RODDY spoke to [place] w/ Shamar Christensen on WILL CALL til 10/21. PCS completed and will need date added day of actual DC.    11:40AM  RODDY spoke to [facility] via phone #: [phone]. Per Christina Ny, likely bed available tomorrow 10/20. Christina Ny is also requesting disc of images be sent w/ pt at [facility]. RODDY sent secure chat to pt's RN/Talisha, [facility] RN Solomon Elliott, and Dr. Pineda Mckee w/ the update. RODDY informed RN that disc of images will be tubed to her to place on pt's chart. RODDY spoke to [facility] w/ radiology - confirmed disc of images from this admission will be sent to pt's RN via tube station 130. RODDY updated pt and family via her room phone. PLAN: [facility] Acute Rehab, [facility] on [address] & [facility], PCS completed (needs date) - pending med clear & confirmation on bed availability on 10/20    SW/JESUS to remain available for support and/or discharge planning.          Sharon Salinas, MSW, [address] [facility]

## 2022-10-20 VITALS
RESPIRATION RATE: 18 BRPM | OXYGEN SATURATION: 94 % | WEIGHT: 106.13 LBS | DIASTOLIC BLOOD PRESSURE: 76 MMHG | SYSTOLIC BLOOD PRESSURE: 124 MMHG | HEART RATE: 66 BPM | BODY MASS INDEX: 19.53 KG/M2 | HEIGHT: 62 IN | TEMPERATURE: 99 F

## 2022-10-20 LAB
GLUCOSE BLDC GLUCOMTR-MCNC: 102 MG/DL (ref 70–99)
GLUCOSE BLDC GLUCOMTR-MCNC: 110 MG/DL (ref 70–99)
GLUCOSE BLDC GLUCOMTR-MCNC: 94 MG/DL (ref 70–99)

## 2022-10-20 RX ORDER — MELATONIN
Qty: 30 TABLET | Refills: 0 | Status: SHIPPED | COMMUNITY
Start: 2022-10-20

## 2022-10-20 RX ORDER — LORAZEPAM 0.5 MG/1
0.5 TABLET ORAL EVERY 4 HOURS PRN
Status: DISCONTINUED | OUTPATIENT
Start: 2022-10-20 | End: 2022-10-20

## 2022-10-20 RX ORDER — LORAZEPAM 0.5 MG/1
0.5 TABLET ORAL EVERY 4 HOURS PRN
Qty: 15 TABLET | Refills: 0 | Status: SHIPPED | OUTPATIENT
Start: 2022-10-20

## 2022-10-20 RX ORDER — MELATONIN
3 NIGHTLY
Status: DISCONTINUED | OUTPATIENT
Start: 2022-10-20 | End: 2022-10-20

## 2022-10-20 NOTE — PLAN OF CARE
Pt. clear for discharge to [place]. Subject to follow up in [address] [place] [address]. for lung nodule found in chest CT. Pt. Ambulating in [address] w/o complaints, pt. And family updated on plan of care. Problem: Patient Centered Care  Goal: Patient preferences are identified and integrated in the patient's plan of care  Description: Interventions:  - What would you like us to know as we care for you? From home alone  - Provide timely, complete, and accurate information to patient/family  - Incorporate patient and family knowledge, values, beliefs, and cultural backgrounds into the planning and delivery of care  - Encourage patient/family to participate in care and decision-making at the level they choose  - Honor patient and family perspectives and choices  Outcome: Progressing     Problem: CARDIOVASCULAR - ADULT  Goal: Maintains optimal cardiac output and hemodynamic stability  Description: INTERVENTIONS:  - Monitor vital signs, rhythm, and trends  - Monitor for bleeding, hypotension and signs of decreased cardiac output  - Evaluate effectiveness of vasoactive medications to optimize hemodynamic stability  - Monitor arterial and/or venous puncture sites for bleeding and/or hematoma  - Assess quality of pulses, skin color and temperature  - Assess for signs of decreased coronary artery perfusion - ex.  Angina  - Evaluate fluid balance, assess for edema, trend weights  Outcome: Progressing  Goal: Absence of cardiac arrhythmias or at baseline  Description: INTERVENTIONS:  - Continuous cardiac monitoring, monitor vital signs, obtain 12 lead EKG if indicated  - Evaluate effectiveness of antiarrhythmic and heart rate control medications as ordered  - Initiate emergency measures for life threatening arrhythmias  - Monitor electrolytes and administer replacement therapy as ordered  Outcome: Progressing     Problem: METABOLIC/FLUID AND ELECTROLYTES - ADULT  Goal: Electrolytes maintained within normal limits  Description: INTERVENTIONS:  - Monitor labs and rhythm and assess patient for signs and symptoms of electrolyte imbalances  - Administer electrolyte replacement as ordered  - Monitor response to electrolyte replacements, including rhythm and repeat lab results as appropriate  - Fluid restriction as ordered  - Instruct patient on fluid and nutrition restrictions as appropriate  Outcome: Progressing     Problem: NEUROLOGICAL - ADULT  Goal: Achieves stable or improved neurological status  Description: INTERVENTIONS  - Assess for and report changes in neurological status  - Initiate measures to prevent increased intracranial pressure  - Maintain blood pressure and fluid volume within ordered parameters to optimize cerebral perfusion and minimize risk of hemorrhage  - Monitor temperature, glucose, and sodium.  Initiate appropriate interventions as ordered  Outcome: Progressing  Goal: Achieves maximal functionality and self care  Description: INTERVENTIONS  - Monitor swallowing and airway patency with patient fatigue and changes in neurological status  - Encourage and assist patient to increase activity and self care with guidance from PT/OT  - Encourage visually impaired, hearing impaired and aphasic patients to use assistive/communication devices  Outcome: Progressing

## 2022-10-20 NOTE — CM/SW NOTE
10/20/22 1000   Discharge disposition   Expected discharge disposition [facility] [address] Acute Care Provider   (Carli Gertrude)   Discharge transportation [address]     Per RN rounds, pt is medically cleared for DC today pending bed available at [facility]. MD placed DC order. SW received call from liaison BODØ - bed is available today. She is requesting transport  pt at [address] updated pt's Latricia Bhat, and Dr. Yamini Oneil via secure chat. SW requested RN confirm that disc of images SW had tubed to pt's RN yesterday has been placed on pt's chart for transfer. SW requested RN update pt's family who is present at bedside. Received confirmation from Dr. Yamini Oneil - he provided update to pt's family.     RODDY spoke to Augusto szymanski/ [facility] set for [employer]. PCS completed in Epic - pt's RN to print w/pt's AVS.    23rd Floor, Room provided at time of admission    Report phone #: [phone]: [facility] Acute, [address] set for [employer], PCS completed        MICHAEL Jiang, [address] [facility]

## 2022-10-20 NOTE — PLAN OF CARE
No acute changes overnight for Phylicia Connor, she denied any pain, SOB, headaches or sensory/vision changes. She is anticipated to be discharged to [facility] later today pending medical clearance. Problem: Patient Centered Care  Goal: Patient preferences are identified and integrated in the patient's plan of care  Description: Interventions:  - What would you like us to know as we care for you? From home alone  - Provide timely, complete, and accurate information to patient/family  - Incorporate patient and family knowledge, values, beliefs, and cultural backgrounds into the planning and delivery of care  - Encourage patient/family to participate in care and decision-making at the level they choose  - Honor patient and family perspectives and choices  Outcome: Progressing     Problem: CARDIOVASCULAR - ADULT  Goal: Maintains optimal cardiac output and hemodynamic stability  Description: INTERVENTIONS:  - Monitor vital signs, rhythm, and trends  - Monitor for bleeding, hypotension and signs of decreased cardiac output  - Evaluate effectiveness of vasoactive medications to optimize hemodynamic stability  - Monitor arterial and/or venous puncture sites for bleeding and/or hematoma  - Assess quality of pulses, skin color and temperature  - Assess for signs of decreased coronary artery perfusion - ex.  Angina  - Evaluate fluid balance, assess for edema, trend weights  Outcome: Progressing  Goal: Absence of cardiac arrhythmias or at baseline  Description: INTERVENTIONS:  - Continuous cardiac monitoring, monitor vital signs, obtain 12 lead EKG if indicated  - Evaluate effectiveness of antiarrhythmic and heart rate control medications as ordered  - Initiate emergency measures for life threatening arrhythmias  - Monitor electrolytes and administer replacement therapy as ordered  Outcome: Progressing     Problem: METABOLIC/FLUID AND ELECTROLYTES - ADULT  Goal: Electrolytes maintained within normal limits  Description: INTERVENTIONS:  - Monitor labs and rhythm and assess patient for signs and symptoms of electrolyte imbalances  - Administer electrolyte replacement as ordered  - Monitor response to electrolyte replacements, including rhythm and repeat lab results as appropriate  - Fluid restriction as ordered  - Instruct patient on fluid and nutrition restrictions as appropriate  Outcome: Progressing     Problem: NEUROLOGICAL - ADULT  Goal: Achieves stable or improved neurological status  Description: INTERVENTIONS  - Assess for and report changes in neurological status  - Initiate measures to prevent increased intracranial pressure  - Maintain blood pressure and fluid volume within ordered parameters to optimize cerebral perfusion and minimize risk of hemorrhage  - Monitor temperature, glucose, and sodium.  Initiate appropriate interventions as ordered  Outcome: Progressing  Goal: Achieves maximal functionality and self care  Description: INTERVENTIONS  - Monitor swallowing and airway patency with patient fatigue and changes in neurological status  - Encourage and assist patient to increase activity and self care with guidance from PT/OT  - Encourage visually impaired, hearing impaired and aphasic patients to use assistive/communication devices  Outcome: Progressing     Problem: Patient/Family Goals  Goal: Patient/Family Long Term Goal  Description: Patient's Long Term Goal: Maximize my recovery from the stroke to minimize deficits    Interventions:  - Ambulate as tolerated  - Neuro checks  - Brain imaging  - Neuro consult  - PT/OT   - Rehab - discharge education  - See additional Care Plan goals for specific interventions  Outcome: Progressing  Goal: Patient/Family Short Term Goal  Description: Patient's Short Term Goal: to regain my strength    Interventions:   - Ambulate as tolerated  - PT/OT  - See additional Care Plan goals for specific interventions  Outcome: Progressing

## 2022-10-20 NOTE — PLAN OF CARE
Pt received in no acute distress. No c/o, awaiting bed availability at [facility], plan is possible DC tomorrow. Fall/safety precautions in place, call light within pt reach, hourly rounding maintained. Problem: Patient Centered Care  Goal: Patient preferences are identified and integrated in the patient's plan of care  Description: Interventions:  - What would you like us to know as we care for you? From home alone  - Provide timely, complete, and accurate information to patient/family  - Incorporate patient and family knowledge, values, beliefs, and cultural backgrounds into the planning and delivery of care  - Encourage patient/family to participate in care and decision-making at the level they choose  - Honor patient and family perspectives and choices  Outcome: Progressing     Problem: CARDIOVASCULAR - ADULT  Goal: Maintains optimal cardiac output and hemodynamic stability  Description: INTERVENTIONS:  - Monitor vital signs, rhythm, and trends  - Monitor for bleeding, hypotension and signs of decreased cardiac output  - Evaluate effectiveness of vasoactive medications to optimize hemodynamic stability  - Monitor arterial and/or venous puncture sites for bleeding and/or hematoma  - Assess quality of pulses, skin color and temperature  - Assess for signs of decreased coronary artery perfusion - ex.  Angina  - Evaluate fluid balance, assess for edema, trend weights  Outcome: Progressing  Goal: Absence of cardiac arrhythmias or at baseline  Description: INTERVENTIONS:  - Continuous cardiac monitoring, monitor vital signs, obtain 12 lead EKG if indicated  - Evaluate effectiveness of antiarrhythmic and heart rate control medications as ordered  - Initiate emergency measures for life threatening arrhythmias  - Monitor electrolytes and administer replacement therapy as ordered  Outcome: Progressing     Problem: METABOLIC/FLUID AND ELECTROLYTES - ADULT  Goal: Electrolytes maintained within normal limits  Description: INTERVENTIONS:  - Monitor labs and rhythm and assess patient for signs and symptoms of electrolyte imbalances  - Administer electrolyte replacement as ordered  - Monitor response to electrolyte replacements, including rhythm and repeat lab results as appropriate  - Fluid restriction as ordered  - Instruct patient on fluid and nutrition restrictions as appropriate  Outcome: Progressing     Problem: NEUROLOGICAL - ADULT  Goal: Achieves stable or improved neurological status  Description: INTERVENTIONS  - Assess for and report changes in neurological status  - Initiate measures to prevent increased intracranial pressure  - Maintain blood pressure and fluid volume within ordered parameters to optimize cerebral perfusion and minimize risk of hemorrhage  - Monitor temperature, glucose, and sodium.  Initiate appropriate interventions as ordered  Outcome: Progressing  Goal: Achieves maximal functionality and self care  Description: INTERVENTIONS  - Monitor swallowing and airway patency with patient fatigue and changes in neurological status  - Encourage and assist patient to increase activity and self care with guidance from PT/OT  - Encourage visually impaired, hearing impaired and aphasic patients to use assistive/communication devices  Outcome: Progressing     Problem: Patient/Family Goals  Goal: Patient/Family Long Term Goal  Description: Patient's Long Term Goal: Maximize my recovery from the stroke to minimize deficits    Interventions:  - Ambulate as tolerated  - Neuro checks  - Brain imaging  - Neuro consult  - PT/OT   - Rehab - discharge education  - See additional Care Plan goals for specific interventions  Outcome: Progressing  Goal: Patient/Family Short Term Goal  Description: Patient's Short Term Goal: to regain my strength    Interventions:   - Ambulate as tolerated  - PT/OT  - See additional Care Plan goals for specific interventions  Outcome: Progressing

## 2022-10-20 NOTE — DISCHARGE PLANNING
Patient chart reviewed for discharge: Medication Reconciliation completed, Specialist/PCP follow up listed, and disease specific Instructions/Education included in After Visit Summary. Verified imaging disc in patient's chart--primary RN to include with discharge paperwork. Discharge RN notified patient's RN of AVS completion and verified all consultants have signed off. Patient's RN to notify DC RN if discharge status changes.         Michael Perez RN, Discharge Leader [id number]

## 2022-10-21 NOTE — PROGRESS NOTES
Pt. Clear for discharge. After visit summary given to son. Report given to nurse at rehab facility. Pt. sent with paper perscription, CT. Imaging, POA paperwork. IV and telemonitor removed. Belongings sent w/ son.

## 2022-12-01 ENCOUNTER — TELEPHONE (OUTPATIENT)
Dept: NEUROLOGY | Facility: CLINIC | Age: 72
End: 2022-12-01

## 2022-12-01 DIAGNOSIS — I63.9 ACUTE CVA (CEREBROVASCULAR ACCIDENT) (HCC): Primary | ICD-10-CM

## 2022-12-01 NOTE — TELEPHONE ENCOUNTER
Left detailed message to notify order placed. Central scheduling phone number provided at time of call.      Advised to call office with questions

## 2022-12-01 NOTE — TELEPHONE ENCOUNTER
Called & spoke to patient. She states she went to [address] [facility] [address] lab from [facility]. Her discharge paperwork from [facility] stated Dr Magdy Daniels recommended a MRI Brain & Cervical spine (W+WO) & it should be completed within 4 weeks. She called central scheduling & was told only MRI C-Spine ordered. The reason she called the office today was to clarify if she needed both MRI's or just MRI C-Spine. If both MRI's needed, an order for MRI Brain (W+WO) will need to be placed.

## 2022-12-29 DIAGNOSIS — I63.9 ACUTE CVA (CEREBROVASCULAR ACCIDENT) (HCC): Primary | ICD-10-CM

## 2022-12-29 RX ORDER — LORAZEPAM 1 MG/1
1 TABLET ORAL
Qty: 1 TABLET | Refills: 0 | Status: SHIPPED | OUTPATIENT
Start: 2022-12-29 | End: 2022-12-29

## 2022-12-30 ENCOUNTER — HOSPITAL ENCOUNTER (OUTPATIENT)
Dept: MRI IMAGING | Age: 72
Discharge: HOME | End: 2022-12-30
Attending: Other
Payer: MEDICARE

## 2022-12-30 DIAGNOSIS — R29.2 HOFFMAN SIGN PRESENT: ICD-10-CM

## 2022-12-30 DIAGNOSIS — I63.9 ACUTE CVA (CEREBROVASCULAR ACCIDENT) (HCC): ICD-10-CM

## 2022-12-30 LAB
CREAT BLD-MCNC: 0.8 MG/DL
GFR SERPLBLD BASED ON 1.73 SQ M-ARVRAT: 78 ML/MIN/1.73M2 (ref 60–?)

## 2022-12-30 PROCEDURE — 70553 MRI BRAIN STEM W/O & W/DYE: CPT | Performed by: OTHER

## 2022-12-30 PROCEDURE — 82565 ASSAY OF CREATININE: CPT

## 2022-12-30 PROCEDURE — 72156 MRI NECK SPINE W/O & W/DYE: CPT | Performed by: OTHER

## 2022-12-30 PROCEDURE — A9575 INJ GADOTERATE MEGLUMI 0.1ML: HCPCS | Performed by: OTHER

## 2022-12-30 RX ORDER — DIPHENHYDRAMINE HYDROCHLORIDE 50 MG/ML
10 INJECTION, SOLUTION INTRAMUSCULAR; INTRAVENOUS
Status: COMPLETED | OUTPATIENT
Start: 2022-12-30 | End: 2022-12-30

## 2022-12-30 RX ADMIN — DIPHENHYDRAMINE HYDROCHLORIDE 9 ML: 50 INJECTION, SOLUTION INTRAMUSCULAR; INTRAVENOUS at 17:12:00

## 2023-01-02 ENCOUNTER — PATIENT MESSAGE (OUTPATIENT)
Dept: NEUROLOGY | Facility: CLINIC | Age: 73
End: 2023-01-02

## 2023-01-04 NOTE — TELEPHONE ENCOUNTER
Dr. August Ontiveros, please advise. Reviewed and electronically signed by:  [facility], [address] [place] [address], [facility]

## 2023-01-31 ENCOUNTER — OFFICE VISIT (OUTPATIENT)
Dept: NEUROLOGY | Facility: CLINIC | Age: 73
End: 2023-01-31
Payer: MEDICARE

## 2023-01-31 VITALS — HEIGHT: 62 IN | WEIGHT: 114 LBS | BODY MASS INDEX: 20.98 KG/M2

## 2023-01-31 DIAGNOSIS — I63.9 ACUTE CVA (CEREBROVASCULAR ACCIDENT) (HCC): Primary | ICD-10-CM

## 2023-01-31 DIAGNOSIS — M48.02 CERVICAL STENOSIS OF SPINAL CANAL: ICD-10-CM

## 2023-01-31 PROCEDURE — 99214 OFFICE O/P EST MOD 30 MIN: CPT | Performed by: OTHER

## 2023-01-31 NOTE — PATIENT INSTRUCTIONS
A Stroke is when part of your brain does not get blood because of blockage in an artery, leading to that part of the brain dying. This is known as an ischemic stroke, because the brain does not get enough blood. To lower your risk of another stroke, you need to take your  Aspirin 81 mg daily as previously prescribed. If for any reason you have difficulty taking the medication as directed, please call our office and explain your difficulty. Do not stop taking your medications until you have been instructed to do so as this can increase your stroke risk. Risk factors that can be controlled with medications and lifestyle changes include:     High blood pressure (hypertension): It is recommended that your blood pressure be less than  130/80 to lower your risk of stroke. Check your blood pressure twice a day. Once in the morning, right when you wake up and before you take your morning medications. Check it again once in the evening after you have taken any evening medications. Write down these values for 2 weeks. Bring them to your primary care physician and your next stroke follow-up visit. High cholesterol: It is recommended that your low-density lipoprotein cholesterol (LDL-C) should be less than 70. You have been prescribed Atorvastatin 40 to be taken at bedtime to manage your cholesterol. Diabetes: To prevent further strokes, your fasting blood sugar should be , and your HbA1c should be less than 6.5. Smoking: It is recommended that you ABSTAIN from smoking. If necessary, there are resources available to assist you with smoking cessation. If you smoke or use tobacco products, discuss alternatives with your doctor. [place] Tobacco Quit Line: 5-862-929-506-641-9821  [place] Tobacco Quit Line: 1-148-768-686-192-7872     Diet: We recommend the Mediterranean/DASH diet -- high in fresh fruits (apples, blueberries) and vegetables (dark green such as spinach, kale).  Fish and nuts (walnuts, almonds), olive oil or canola oil. Reduced red meat, cheese/dairy, and eggs. Also recommended is moderate sodium intake. Sedentary lifestyle: Try to engage in routine exercise (3-5 sessions of aerobic exercise per week, 30 minutes per session). Talk with your primary care physician about what type of exercise might be best for you. Snoring: If snoring, consider referral for possible obstructive sleep apnea (MARLENE). Treating obstructive sleep apnea will decrease your risk of developing dementia, can lower your blood pressure, will improve your energy levels, may treat headache, and may decrease your risk of stroke. Stroke symptoms include the following, and 911 should be called immediately if you experience any of these:       B.E. F.A.S.T.    B: Balance-- sudden loss  of balance, staggering gait, severe vertigo        E: Eyes-- sudden loss of vision in one or both eyes, onset of double vision        F: Face-- uneven or drooping face, drooling, ask the patient to smile        A: Arm (leg)-- loss of strength or sensation on one side of the body in the arm and/or leg        S: Speech-- slurring of speech, difficulty  saying words or understanding what is being said, sudden confusion        T: Terrible headache (time*)-- very severe headache which has maximum intensity within seconds to a minute        * Time of symptom onset and last known well times are important when determining what treatment is appropriate for an individual's stroke, particularly since treatment is time limited. Time is not a symptom or sign of stroke. Traditionally, Time was used for the \"T\" in the FAST and BEFAST acronyms for stroke awareness. Since terrible headache can be a symptom of stroke this is substituted. However, as the acronym B.E. F.A.S.T. suggests, acting quickly is of critical importance after stroke is suspected. Knowing the symptom onset time or time when last well will have an impact on what treatments can be offered safely.

## 2023-04-28 ENCOUNTER — HOSPITAL ENCOUNTER (OUTPATIENT)
Dept: CT IMAGING | Facility: HOSPITAL | Age: 73
Discharge: HOME | End: 2023-04-28
Attending: NURSE PRACTITIONER
Payer: MEDICARE

## 2023-04-28 DIAGNOSIS — R91.8 LUNG NODULES: ICD-10-CM

## 2023-04-28 PROCEDURE — 71250 CT THORAX DX C-: CPT | Performed by: NURSE PRACTITIONER

## 2023-10-27 ENCOUNTER — PATIENT MESSAGE (OUTPATIENT)
Dept: NEUROLOGY | Facility: CLINIC | Age: 73
End: 2023-10-27

## 2023-10-27 DIAGNOSIS — I63.9 ACUTE CVA (CEREBROVASCULAR ACCIDENT) (HCC): Primary | ICD-10-CM

## 2023-10-30 NOTE — TELEPHONE ENCOUNTER
From: Ceasar Hernandez  To: Reba Ugarte  Sent: 10/27/2023 4:43 PM CDT  Subject: m    Dr. Leticia Street, When I had my last visit with you, you said you would take me off the statin ,  by August. have been taking 20 mg since June because I did not feel well on the drug. My lipids are good, the concern is my CK and liver enzymes all went up significantly.   Please send an RX for 10[facility] to [facility] ([facility]).    Thank you,  Pedrito Lee

## 2023-10-30 NOTE — TELEPHONE ENCOUNTER
Pt returned call & reports Lipid panel done on 10/18/23 with Duly & LDL 83. (Requested patient provide documentation of lab as we are unable to see it in epic). She knows LDL is not less than 70, but feels fatigue & achy muscles since starting the medication. Symptoms did improve some when she decreased the dose from 40 mg to 20 mg back in June. She is again feeling fatigued & achy & would like to decrease her atorvastatin dose to 10 mg instead of 20 mg. Patient aware Dr Daryl Scott currently out of the office. She is willing to wait until he returns if the covering provider does not feel comfortable reducing the dose.

## 2023-10-30 NOTE — TELEPHONE ENCOUNTER
Per epic review, LOV note from 1/31/23 with Dr Kevin Lacey,     1. Right putaminal infarct  Differential diagnosis:  Embolic/artery-to-artery; suspicion for an embolic etiology was high when the patient was admitted. This is based on the morphology/size of the infarct. 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 Small vessel disease  Less likely to be due to lacunar infarct involving a single lenticulostriate vessel; given the size expect multiple vessels to be involved i.e. branch artery atheroma. This is when there is disease in the parent vessel (MCA) leading to an atheroma occluding multiple penetrating small vessels. .  Diagnostics:  Can order repeat LDL in 6 months to see if it is less than 70. If she is less than 70 then could consider lowering the dose of statin. Therapeutics  Cont antithrombotics, aspirin 81 . And high intensity statin, atorvastatin 40. Home BP monitoring. Pt instructed to check BP at home in the AM and PM, and bring values to future clinic visits. BP goal is for normotension, < 130/80. HbA1c goal <7.0  LDL-C goal  <70 mg/dL. Frequent exercise as per AHA/ASA recs (30 min of aerobic exercise, 5 times per week). If snoring, consider referral for sleep study for possible MARLENE  Recommend DASH diet. A diet that is rich in fruits and vegetables and thereby high in potassium is beneficial. Recommend reduced intake of sodium and increased intake of potassium, increase consumption of fruits, vegetables, and low-fat dairy products and  decreased consumption of saturated fat.

## 2023-12-02 DIAGNOSIS — I63.9 ACUTE CVA (CEREBROVASCULAR ACCIDENT) (HCC): Primary | ICD-10-CM

## 2023-12-04 RX ORDER — ASPIRIN 81 MG
81 TABLET,CHEWABLE ORAL DAILY
Qty: 90 TABLET | Refills: 0 | Status: SHIPPED | OUTPATIENT
Start: 2023-12-04

## 2023-12-04 NOTE — TELEPHONE ENCOUNTER
Requested Prescriptions     Pending Prescriptions Disp Refills    ASPIRIN LOW DOSE 81 MG Oral Chew Tab [Pharmacy Med Name: ASPIRIN 81 MG CHEWABLE TABLET] 90 tablet 3     Sig: CHEW 1 TABLET BY MOUTH DAILY.         LOV: 1/31/23  NOV: none    Last refill/ILPMP: 10/19/22

## 2024-03-13 DIAGNOSIS — I63.9 ACUTE CVA (CEREBROVASCULAR ACCIDENT) (HCC): ICD-10-CM

## 2024-03-14 RX ORDER — ASPIRIN 81 MG
81 TABLET,CHEWABLE ORAL DAILY
Qty: 90 TABLET | Refills: 0 | OUTPATIENT
Start: 2024-03-14

## 2024-03-14 NOTE — TELEPHONE ENCOUNTER
Requested Prescriptions     Pending Prescriptions Disp Refills    ASPIRIN LOW DOSE 81 MG Oral Chew Tab [Pharmacy Med Name: ASPIRIN 81 MG CHEWABLE TABLET] 90 tablet 0     Sig: CHEW 1 TABLET BY MOUTH AND SWALLOW EVERY DAY       Last OV: 1/31/23  Next OV: None  Last refilled: 12/4/23 #90  Front end, please contact the patient to schedule an OV.  Thanks.  Reviewed and electronically signed by: ANGELICA Morrison

## 2024-04-22 ENCOUNTER — HOSPITAL ENCOUNTER (EMERGENCY)
Facility: HOSPITAL | Age: 74
Discharge: HOME | End: 2024-04-22
Attending: EMERGENCY MEDICINE
Payer: MEDICARE

## 2024-04-22 ENCOUNTER — OFFICE VISIT (OUTPATIENT)
Dept: FAMILY MEDICINE CLINIC | Facility: CLINIC | Age: 74
End: 2024-04-22
Payer: MEDICARE

## 2024-04-22 VITALS
HEART RATE: 71 BPM | BODY MASS INDEX: 20.8 KG/M2 | TEMPERATURE: 98 F | OXYGEN SATURATION: 98 % | WEIGHT: 113 LBS | HEIGHT: 62 IN | SYSTOLIC BLOOD PRESSURE: 153 MMHG | RESPIRATION RATE: 19 BRPM | DIASTOLIC BLOOD PRESSURE: 67 MMHG

## 2024-04-22 VITALS
OXYGEN SATURATION: 97 % | RESPIRATION RATE: 16 BRPM | WEIGHT: 113 LBS | DIASTOLIC BLOOD PRESSURE: 60 MMHG | HEART RATE: 76 BPM | HEIGHT: 62 IN | TEMPERATURE: 99 F | SYSTOLIC BLOOD PRESSURE: 138 MMHG | BODY MASS INDEX: 20.8 KG/M2

## 2024-04-22 DIAGNOSIS — J01.90 ACUTE SINUSITIS, RECURRENCE NOT SPECIFIED, UNSPECIFIED LOCATION: Primary | ICD-10-CM

## 2024-04-22 DIAGNOSIS — R42 VERTIGO: ICD-10-CM

## 2024-04-22 DIAGNOSIS — R42 DIZZINESS: Primary | ICD-10-CM

## 2024-04-22 DIAGNOSIS — J34.89 SINUS PRESSURE: ICD-10-CM

## 2024-04-22 DIAGNOSIS — Z86.73 HISTORY OF CVA (CEREBROVASCULAR ACCIDENT): ICD-10-CM

## 2024-04-22 LAB
ALBUMIN SERPL-MCNC: 4.5 G/DL (ref 3.2–4.8)
ALBUMIN/GLOB SERPL: 1.7 {RATIO} (ref 1–2)
ALP LIVER SERPL-CCNC: 103 U/L
ALT SERPL-CCNC: 40 U/L
ANION GAP SERPL CALC-SCNC: 5 MMOL/L (ref 0–18)
AST SERPL-CCNC: 33 U/L (ref ?–34)
BASOPHILS # BLD AUTO: 0.07 X10(3) UL (ref 0–0.2)
BASOPHILS NFR BLD AUTO: 1 %
BILIRUB SERPL-MCNC: 0.2 MG/DL (ref 0.2–1.1)
BUN BLD-MCNC: 37 MG/DL (ref 9–23)
BUN/CREAT SERPL: 57.8 (ref 10–20)
CALCIUM BLD-MCNC: 9.4 MG/DL (ref 8.7–10.4)
CHLORIDE SERPL-SCNC: 110 MMOL/L (ref 98–112)
CO2 SERPL-SCNC: 27 MMOL/L (ref 21–32)
CONTROL LINE PRESENT WITH A CLEAR BACKGROUND (YES/NO): YES YES/NO
CREAT BLD-MCNC: 0.64 MG/DL
DEPRECATED RDW RBC AUTO: 44.1 FL (ref 35.1–46.3)
EGFRCR SERPLBLD CKD-EPI 2021: 92 ML/MIN/1.73M2 (ref 60–?)
EOSINOPHIL # BLD AUTO: 0.08 X10(3) UL (ref 0–0.7)
EOSINOPHIL NFR BLD AUTO: 1.1 %
ERYTHROCYTE [DISTWIDTH] IN BLOOD BY AUTOMATED COUNT: 13 % (ref 11–15)
GLOBULIN PLAS-MCNC: 2.6 G/DL (ref 2.8–4.4)
GLUCOSE BLD-MCNC: 99 MG/DL (ref 70–99)
HCT VFR BLD AUTO: 42 %
HGB BLD-MCNC: 14.2 G/DL
IMM GRANULOCYTES # BLD AUTO: 0.02 X10(3) UL (ref 0–1)
IMM GRANULOCYTES NFR BLD: 0.3 %
KIT LOT #: NORMAL NUMERIC
LYMPHOCYTES # BLD AUTO: 1.95 X10(3) UL (ref 1–4)
LYMPHOCYTES NFR BLD AUTO: 27 %
MCH RBC QN AUTO: 31.1 PG (ref 26–34)
MCHC RBC AUTO-ENTMCNC: 33.8 G/DL (ref 31–37)
MCV RBC AUTO: 92.1 FL
MONOCYTES # BLD AUTO: 0.79 X10(3) UL (ref 0.1–1)
MONOCYTES NFR BLD AUTO: 10.9 %
NEUTROPHILS # BLD AUTO: 4.31 X10 (3) UL (ref 1.5–7.7)
NEUTROPHILS # BLD AUTO: 4.31 X10(3) UL (ref 1.5–7.7)
NEUTROPHILS NFR BLD AUTO: 59.7 %
OPERATOR ID: NORMAL
OSMOLALITY SERPL CALC.SUM OF ELEC: 303 MOSM/KG (ref 275–295)
PLATELET # BLD AUTO: 261 10(3)UL (ref 150–450)
POTASSIUM SERPL-SCNC: 4.3 MMOL/L (ref 3.5–5.1)
PROT SERPL-MCNC: 7.1 G/DL (ref 5.7–8.2)
RAPID SARS-COV-2 BY PCR: NOT DETECTED
RBC # BLD AUTO: 4.56 X10(6)UL
SODIUM SERPL-SCNC: 142 MMOL/L (ref 136–145)
TROPONIN I SERPL HS-MCNC: 7 NG/L
WBC # BLD AUTO: 7.2 X10(3) UL (ref 4–11)

## 2024-04-22 PROCEDURE — 93005 ELECTROCARDIOGRAM TRACING: CPT

## 2024-04-22 PROCEDURE — 99284 EMERGENCY DEPT VISIT MOD MDM: CPT

## 2024-04-22 PROCEDURE — 93010 ELECTROCARDIOGRAM REPORT: CPT

## 2024-04-22 PROCEDURE — 85025 COMPLETE CBC W/AUTO DIFF WBC: CPT | Performed by: EMERGENCY MEDICINE

## 2024-04-22 PROCEDURE — 80053 COMPREHEN METABOLIC PANEL: CPT | Performed by: EMERGENCY MEDICINE

## 2024-04-22 PROCEDURE — 36415 COLL VENOUS BLD VENIPUNCTURE: CPT

## 2024-04-22 PROCEDURE — 84484 ASSAY OF TROPONIN QUANT: CPT | Performed by: EMERGENCY MEDICINE

## 2024-04-22 RX ORDER — AZITHROMYCIN 250 MG/1
TABLET, FILM COATED ORAL
Qty: 6 TABLET | Refills: 0 | Status: SHIPPED | OUTPATIENT
Start: 2024-04-22 | End: 2024-04-27

## 2024-04-22 RX ORDER — ESCITALOPRAM OXALATE 5 MG/1
5 TABLET ORAL DAILY
COMMUNITY

## 2024-04-22 RX ORDER — MECLIZINE HYDROCHLORIDE 25 MG/1
25 TABLET ORAL 3 TIMES DAILY PRN
Qty: 10 TABLET | Refills: 0 | Status: SHIPPED | OUTPATIENT
Start: 2024-04-22

## 2024-04-22 NOTE — PROGRESS NOTES
CHIEF COMPLAINT:     Chief Complaint   Patient presents with    Sinus Problem     Dizzy when switching positions in bed and getting up, upset stomach, bilat ears clogged and slight pain, fatigued, pressure to cheeks   Sx onset 4 days          HPI:     Vicky Choudhary is a 74 year old female presents with complaint of dizziness in the mornings for the past 4 days.  Has had some concurrent sinus type symptoms with c/o bilat ears clogged, slight ear pain, maxillary sinus tenderness, PND, stomach upset today, mild throat irritation, fatigue, feels a little warm today.  Hasn't checked temp at home.  Denies known fever, headache, weakness, slurred speech, facial droop, confusion, dyspnes, chest pain, SOB, or GI complaints.  No known ill contacts.  Hx of semi recent CVA in 2022.  Reports that did feel different, more of a heaviness like she was being pulled down.  Denies Hx of vertigo in recent years.  Had some vertigo many years ago but felt different to her than current.       Current Outpatient Medications   Medication Sig Dispense Refill    ASPIRIN LOW DOSE 81 MG Oral Chew Tab CHEW 1 TABLET BY MOUTH DAILY. 90 tablet 0    atorvastatin 10 MG Oral Tab Take 1 tablet (10 mg total) by mouth nightly. 90 tablet 0    LORazepam 0.5 MG Oral Tab Take 1 tablet (0.5 mg total) by mouth every 4 (four) hours as needed for Anxiety. 15 tablet 0    melatonin 3 MG Oral Tab  30 tablet 0    Nutritional Supplements (JUICE PLUS FIBRE OR) Take 2 capsules by mouth daily.      Coenzyme Q10 (CO Q-10) 400 MG Oral Cap Take 3 capsules by mouth daily.        Past Medical History:    Cervical cancer (HCC)    Hyperlipidemia    Pneumonia    Prediabetes    Rhinitis      Social History:  Social History     Socioeconomic History    Marital status: Single   Tobacco Use    Smoking status: Never    Smokeless tobacco: Never   Substance and Sexual Activity    Alcohol use: Yes     Comment: social    Drug use: No        REVIEW OF SYSTEMS:   GENERAL:  Denies  fever, chills,weight change, decreased appetite.  SKIN: Denies rashes, skin wounds or ulcers.  EYES: Denies blurred vision or double vision  HENT: See HPI  CHEST: Denies chest pain, or palpitations  LUNGS: Denies shortness of breath, cough, or wheezing  GI: Denies abdominal pain, N/V/C/D.   MUSCULOSKELETAL: no arthralgia or swollen joints  LYMPH:  Denies lymphadenopathy  NEURO:  See HPI    EXAM:   /60   Pulse 76   Temp 99.2 °F (37.3 °C)   Resp 16   Ht 5' 2\" (1.575 m)   Wt 113 lb (51.3 kg)   SpO2 97%   BMI 20.67 kg/m²   GENERAL: Well-appearing, well developed, well nourished,in no apparent distress  SKIN: no rashes or suspicious lesions  EYES: Pupils round, reactive to light and accomodation.  EOMI.   HENT: atraumatic, normocephalic, ears and throat are clear  NECK: supple, no bruits  LUNGS: clear to auscultation bilaterally, breathing is non labored  CARDIO: RRR without murmur  NEURO: Alert & Oriented x 3.  CN II-XII intact. Moving all 4 extremities without difficulty.  No facial droop.  No slurred speech.  Patella & Brachioradialis DTR intact bilaterally.  Able to drag heel from knee to ankle without difficulty.  Gait- normal.    EXTREMITIES: no cyanosis, clubbing or edema  LYMPH: No cervical or supraclavicular lymphadenopathy.   PSYCH:  Speech, mood and affect are appropriate.     ASSESSMENT AND PLAN:   ASSESSMENT:   Vicky was seen today for sinus problem.    Diagnoses and all orders for this visit:    Dizziness  -     Rapid Covid-19  -     Strep A Assay W/Optic    Sinus pressure    History of CVA (cerebrovascular accident)        PLAN:   - Negative rapid strep and covid testing.  - Pt with sinus symptoms but predominant symptom of dizziness is present in mornings x4 days.  Hx of semi recent CVA in 2022.  - No current neurological deficits, pt is well appearing but no prior Hx of chronic vertigo.  - Discussed limitations of [facility] and will refer to higher level of care in ED for further  evaluation/Tx.  - Pt agreeable with transfer to ED for the vertigo.  - The patient indicates understanding of these issues and agrees to the plan.  - The patient leaves clinic in no distress.

## 2024-04-22 NOTE — ED INITIAL ASSESSMENT (HPI)
Pt presents to ed from [facility] for vertigo x 4 days. Pt states when she gets up in the morning she is dizzy, ear pain, and runny nose.  Pt reports the dizziness is \"only in the morning,\"     Face symmetrical, speech clear, strength equal bilaterally. Denies vision changes.

## 2024-04-22 NOTE — ED PROVIDER NOTES
Patient Seen in: [facility] Emergency Department    History     Chief Complaint   Patient presents with    Vertigo       HPI    74-year-old female who presents ER today with dizziness on and off for the past few days.  Patient states it is a spinning sensation and feels like vertigo in the past.  Patient states she has had nasal congestion has been get worse over the past 4 days as well.  Denies any chest pain or shortness of breath.  No slurred speech.  No trouble moving arms or legs.    History reviewed.   Past Medical History:    Cervical cancer (HCC)    Hyperlipidemia    Pneumonia    Prediabetes    Rhinitis    Stroke (HCC)       History reviewed.   Past Surgical History:   Procedure Laterality Date    Colonoscopy      Hysterectomy  1998    CAIN BSO     Other surgical history      left knee. Meniscal tear         Medications :  (Not in a hospital admission)       Family History   Problem Relation Age of Onset    Cancer Father         lung    Dementia Mother     No Known Problems Sister     No Known Problems Son     No Known Problems Son        Smoking Status:   Social History     Socioeconomic History    Marital status: Single   Tobacco Use    Smoking status: Never    Smokeless tobacco: Never   Substance and Sexual Activity    Alcohol use: Yes     Comment: social    Drug use: No       Constitutional and vital signs reviewed.      Social History and Family History elements reviewed from today, pertinent positives to the presenting problem noted.    Physical Exam     ED Triage Vitals [04/22/24 1743]   BP (!) 166/71   Pulse 92   Resp 20   Temp 98.4 °F (36.9 °C)   Temp src Oral   SpO2 95 %   O2 Device None (Room air)       All measures to prevent infection transmission during my interaction with the patient were taken. Handwashing was performed prior to and after the exam.  Stethoscope and any equipment used during my examination was cleaned with super sani-cloth germicidal wipes following the exam.      Physical Exam  Vitals and nursing note reviewed.   Constitutional:       Appearance: Normal appearance.   HENT:      Nose:      Comments: Bilateral swollen turbinates with moderate congestion  Cardiovascular:      Rate and Rhythm: Normal rate and regular rhythm.   Pulmonary:      Effort: Pulmonary effort is normal.      Breath sounds: Normal breath sounds.   Abdominal:      Palpations: Abdomen is soft.   Musculoskeletal:         General: Normal range of motion.   Skin:     General: Skin is warm and dry.      Capillary Refill: Capillary refill takes less than 2 seconds.   Neurological:      General: No focal deficit present.      Mental Status: She is alert.   Psychiatric:         Mood and Affect: Mood normal.         ED Course        Labs Reviewed   COMP METABOLIC PANEL (14) - Abnormal; Notable for the following components:       Result Value    BUN 37 (*)     BUN/CREA Ratio 57.8 (*)     Calculated Osmolality 303 (*)     Globulin  2.6 (*)     All other components within normal limits   TROPONIN I HIGH SENSITIVITY - Normal   CBC WITH DIFFERENTIAL WITH PLATELET    Narrative:     The following orders were created for panel order CBC With Differential With Platelet.                  Procedure                               Abnormality         Status                                     ---------                               -----------         ------                                     CBC W/ DIFFERENTIAL[[id number]]                              Final result                                                 Please view results for these tests on the individual orders.   RAINBOW DRAW LAVENDER   RAINBOW DRAW LIGHT GREEN   RAINBOW DRAW BLUE   CBC W/ DIFFERENTIAL     EKG    Rate, intervals and axes as noted on EKG Report.  Rate: 72  Rhythm: Sinus Rhythm  Reading: Normal sinus rhythm, heart rate 72, normal intervals, normal axis           As Interpreted by me    Imaging Results Available and Reviewed while in ED: No results  found.  ED Medications Administered: Medications - No data to display      MDM     Vitals:    04/22/24 1743 04/22/24 1900 04/22/24 1915   BP: (!) 166/71 152/64 153/67   Pulse: 92 68 71   Resp: 20 16 19   Temp: 98.4 °F (36.9 °C)     TempSrc: Oral     SpO2: 95% 96% 98%   Weight: 51.3 kg     Height: 157.5 cm (5' 2\")       *I personally reviewed and interpreted all ED vitals.    Pulse Ox: 95%, Room air, Normal     Monitor Interpretation:   normal sinus rhythm as interpreted by me.  The cardiac monitor was ordered to monitor cardiac rate and rhythm.    Differential Diagnosis/ Diagnostic Considerations: Vertigo, electrolyte abnormalities, arrhythmia    Complicating Factors: The patient already has does not have any pertinent problems on file. to contribute to the complexity of this ED evaluation.    Medical Decision Making  Patient came in with sinus congestion with vertigo on and off for the past few days.  Denies any symptoms at this time or no other than the congestion.  No vertigo at this time.  Will get labs, EKG and reevaluate    Amount and/or Complexity of Data Reviewed  Labs: ordered. Decision-making details documented in ED Course.  ECG/medicine tests: ordered and independent interpretation performed. Decision-making details documented in ED Course.    Risk  Prescription drug management.    I reviewed all labs with patient and there is nothing acute on her labs her EKG was a normal sinus rhythm with nothing acute on the EKG as well.  Her symptoms are consistent with vertigo from a sinus infection.  Will treat with antibiotics and meclizine as needed for dizziness.  Patient does understand to follow-up with her primary care provider 1 to 2 days.  Return to the ER if symptoms continue, get worse, unable to follow-up    Condition upon leaving the department: Stable    Disposition and Plan     Clinical Impression:  1. Acute sinusitis, recurrence not specified, unspecified location    2. Vertigo         Disposition:  Discharge    Follow-up:  Reanna Spicer, NP  [address]  [address]  [facility] [id number] [phone]    Follow up        Medications Prescribed:  Current Discharge Medication List        START taking these medications    Details   azithromycin (ZITHROMAX Z-SOLO) 250 MG Oral Tab 500 mg once followed by 250 mg daily x 4 days  Qty: 6 tablet, Refills: 0      meclizine 25 MG Oral Tab Take 1 tablet (25 mg total) by mouth 3 (three) times daily as needed.  Qty: 10 tablet, Refills: 0

## 2024-04-23 LAB
ATRIAL RATE: 72 BPM
P AXIS: 84 DEGREES
P-R INTERVAL: 134 MS
Q-T INTERVAL: 394 MS
QRS DURATION: 88 MS
QTC CALCULATION (BEZET): 431 MS
R AXIS: 73 DEGREES
T AXIS: 73 DEGREES
VENTRICULAR RATE: 72 BPM

## 2024-04-23 NOTE — DISCHARGE INSTRUCTIONS
Follow-up with your primary care provider 1 to 2 days.  Return to ER if symptoms continue, get worse, unable to follow-up

## 2024-06-06 ENCOUNTER — OFFICE VISIT (OUTPATIENT)
Dept: OTOLARYNGOLOGY | Facility: CLINIC | Age: 74
End: 2024-06-06
Payer: MEDICARE

## 2024-06-06 VITALS — WEIGHT: 115 LBS | BODY MASS INDEX: 20.37 KG/M2 | HEIGHT: 63 IN

## 2024-06-06 DIAGNOSIS — H61.23 CERUMEN DEBRIS ON TYMPANIC MEMBRANE OF BOTH EARS: Primary | ICD-10-CM

## 2024-06-06 PROCEDURE — 69210 REMOVE IMPACTED EAR WAX UNI: CPT | Performed by: SPECIALIST

## 2024-06-06 NOTE — PATIENT INSTRUCTIONS
Cerumen was fully cleaned from both your ears.  Follow-up with any additional questions or problems.

## (undated) NOTE — IP AVS SNAPSHOT
[facility]            (For Outpatient Use Only) Initial Admit Date: 10/16/2022   Inpt/Obs Admit Date: Inpt: 10/16/22 / Obs: N/A   Discharge Date:    Nieves Sadlernight:  [de-identified]   MRN: [de-identified]   CSN: [id number]   CEID: [id number]        ENCOUNTER  Patient Class: Inpatient Admitting Provider: Miky Orozco MD Unit: [address] [place] [address]   Hospital Service: Cardiac Telemetry Attending Provider: Americo Peck DO   Bed: 309-A   Visit Type:   Referring Physician: No ref. provider found Billing Flag:    Admit Diagnosis: Weakness generalized [R53.1]      PATIENT  Legal Name:   Quoc Rosado   Legal Sex: Female  Gender ID:              Pref Name:    PCP:  Lisa Smith,* Home: [phone]   Address:  [address] : 1950 (72 yrs) Mobile: [phone]         City/State/Zip: [place] [address] Marital: Single Language: Thuyafshan Rader Mau SSN4: [id number] Adventist: No Adventist Indicated/Gi*     Race: White Ethnicity: Non  Or  O*   EMERGENCY CONTACT   Name Relationship Legal Guardian? Home Phone Work Phone Mobile Phone   1.  Mireille Temple  2. *No Contact Specified* Sister      [phone]             GUARANTOR  Guarantor: Quoc Rosado : 1950 Home Phone: [phone]   Address: [address]  Sex: Female Work Phone:    City/State/Zip: [place]   Rel. to Patient: Self Guarantor ID: [id number]   GUARANTOR EMPLOYER   Employer:  Status: RETIRED     COVERAGE  PRIMARY INSURANCE   Payor: MEDICARE Plan: MEDICARE PART A&B   Group Number:  Insurance Type: INDEMNITY   Subscriber Name: Marianela Fink : 1950   Subscriber ID: [id number] Pt Rel to Subscriber: Self   SECONDARY INSURANCE   Payor: [address] [place] [address] Drive: [address] [place] [address]   Group Number: [id number] Insurance Type: [id number] Name: Marianela Fink : 1950   Subscriber ID: [id number] Pt Rel to Subscriber: SELF TERTIARY INSURANCE   Payor:  Plan:    Group Number:  Insurance Type:    Subscriber Name:  Subscriber :    Subscriber ID:  Pt Rel to Subscriber:    Hospital Account Financial Class: Medicare    2022

## (undated) NOTE — LETTER
No referring provider defined for this encounter. 01/31/23        Patient: Nitin Anand   YOB: 1950   Date of Visit: 1/31/2023       Dear  Dr. Pedro Michelle, NP,      Thank you for referring Nitin Anand to my practice. Please find my assessment and plan below. Nitin Anand is a 67year old right handed female w/ a pmhx sig. for prediabetes, anxiety, Hx of cervical cancer, and hx of COVID-19 2 weeks PTA who is seen in hospital discharge for a right globus pallidus/putamen stroke. On initial exam in the hospital she had a left hemiparesis and very subtle symptoms concerning for parkinsonism as well as a cervical myelopathy. She had repeat MRI of the brain with and without contrast (initially she only had a 3 sequence MRI) and MRI of the cervical spine with and without contrast.  She does not have significant cervical canal stenosis on her MRI of the cervical spine. MRI of the brain demonstrates normal evolution of a right subcortical/Striatocapsular infarct. On exam she does not appear profoundly parkinsonian, and the rigidity appreciated in the hospital is now minimal on her repeat exam.  Rigidity is right-sided, and could not be accounted for by her right subcortical infarct. Etiology of her stroke may be due to typical vascular structures, although she does not have many. Cannot exclude endothelial dysfunction from COVID-19. Currently she does not have profound sx of Parkinson disease, and does not meet the diagnostic criteria. She can follow-up with neurology as needed.               Sincerely,   Evaristo Urban DO   [address], [address] [facility]  [address] [address]    Document electronically generated by:  Evaristo Urban DO

## (undated) NOTE — LETTER
No referring provider defined for this encounter. 01/31/23        Patient: Shira Resendiz   YOB: 1950   Date of Visit: 1/31/2023       Dear  Dr. Ken Parry, NP,      Thank you for referring Shira Resendiz to my practice. Please find my assessment and plan below.       ***              Sincerely,   Joy Luis DO   [address] [place] [address] [address] [address]    Document electronically generated by:  Joy Luis DO